# Patient Record
Sex: MALE | Race: OTHER | Employment: UNEMPLOYED | URBAN - METROPOLITAN AREA
[De-identification: names, ages, dates, MRNs, and addresses within clinical notes are randomized per-mention and may not be internally consistent; named-entity substitution may affect disease eponyms.]

---

## 2024-02-19 ENCOUNTER — HOSPITAL ENCOUNTER (INPATIENT)
Facility: HOSPITAL | Age: 59
LOS: 9 days | Discharge: HOME | DRG: 885 | End: 2024-02-28
Attending: PSYCHIATRY & NEUROLOGY | Admitting: PSYCHIATRY & NEUROLOGY
Payer: MEDICARE

## 2024-02-19 DIAGNOSIS — F20.0 PARANOID SCHIZOPHRENIA (HCC): Primary | ICD-10-CM

## 2024-02-19 PROBLEM — F20.9 SCHIZOPHRENIA (HCC): Status: ACTIVE | Noted: 2024-02-19

## 2024-02-19 PROCEDURE — 1140000000 HC RM PRIVATE PSYCH

## 2024-02-19 PROCEDURE — 6370000000 HC RX 637 (ALT 250 FOR IP): Performed by: PSYCHIATRY & NEUROLOGY

## 2024-02-19 RX ORDER — LORAZEPAM 1 MG/1
2 TABLET ORAL EVERY 6 HOURS PRN
Status: DISCONTINUED | OUTPATIENT
Start: 2024-02-19 | End: 2024-02-28 | Stop reason: HOSPADM

## 2024-02-19 RX ORDER — ACETAMINOPHEN 325 MG/1
650 TABLET ORAL EVERY 4 HOURS PRN
Status: DISCONTINUED | OUTPATIENT
Start: 2024-02-19 | End: 2024-02-28 | Stop reason: HOSPADM

## 2024-02-19 RX ORDER — AMLODIPINE BESYLATE 5 MG/1
5 TABLET ORAL DAILY
Status: DISCONTINUED | OUTPATIENT
Start: 2024-02-19 | End: 2024-02-25

## 2024-02-19 RX ORDER — OLANZAPINE 5 MG/1
10 TABLET, ORALLY DISINTEGRATING ORAL 2 TIMES DAILY
Status: DISCONTINUED | OUTPATIENT
Start: 2024-02-19 | End: 2024-02-22

## 2024-02-19 RX ORDER — DIVALPROEX SODIUM 500 MG/1
500 TABLET, DELAYED RELEASE ORAL 3 TIMES DAILY
Status: DISCONTINUED | OUTPATIENT
Start: 2024-02-19 | End: 2024-02-28 | Stop reason: HOSPADM

## 2024-02-19 RX ORDER — MAGNESIUM HYDROXIDE/ALUMINUM HYDROXICE/SIMETHICONE 120; 1200; 1200 MG/30ML; MG/30ML; MG/30ML
30 SUSPENSION ORAL EVERY 6 HOURS PRN
Status: DISCONTINUED | OUTPATIENT
Start: 2024-02-19 | End: 2024-02-28 | Stop reason: HOSPADM

## 2024-02-19 RX ORDER — LORAZEPAM 2 MG/ML
2 INJECTION INTRAMUSCULAR EVERY 6 HOURS PRN
Status: DISCONTINUED | OUTPATIENT
Start: 2024-02-19 | End: 2024-02-28 | Stop reason: HOSPADM

## 2024-02-19 RX ORDER — AMLODIPINE BESYLATE 5 MG/1
5 TABLET ORAL DAILY
Status: DISCONTINUED | OUTPATIENT
Start: 2024-02-19 | End: 2024-02-19

## 2024-02-19 RX ORDER — ZIPRASIDONE HYDROCHLORIDE 20 MG/1
20 CAPSULE ORAL EVERY 12 HOURS PRN
Status: DISCONTINUED | OUTPATIENT
Start: 2024-02-19 | End: 2024-02-28 | Stop reason: HOSPADM

## 2024-02-19 RX ORDER — ZIPRASIDONE MESYLATE 20 MG/ML
20 INJECTION, POWDER, LYOPHILIZED, FOR SOLUTION INTRAMUSCULAR EVERY 12 HOURS PRN
Status: DISCONTINUED | OUTPATIENT
Start: 2024-02-19 | End: 2024-02-20

## 2024-02-19 RX ORDER — HYDROXYZINE 50 MG/1
50 TABLET, FILM COATED ORAL 3 TIMES DAILY PRN
Status: DISCONTINUED | OUTPATIENT
Start: 2024-02-19 | End: 2024-02-28 | Stop reason: HOSPADM

## 2024-02-19 RX ADMIN — LORAZEPAM 2 MG: 1 TABLET ORAL at 12:19

## 2024-02-19 RX ADMIN — AMLODIPINE BESYLATE 5 MG: 5 TABLET ORAL at 12:20

## 2024-02-19 RX ADMIN — DIVALPROEX SODIUM 500 MG: 500 TABLET, DELAYED RELEASE ORAL at 22:58

## 2024-02-19 RX ADMIN — OLANZAPINE 10 MG: 5 TABLET, ORALLY DISINTEGRATING ORAL at 22:58

## 2024-02-19 ASSESSMENT — SLEEP AND FATIGUE QUESTIONNAIRES
DO YOU HAVE DIFFICULTY SLEEPING: YES
AVERAGE NUMBER OF SLEEP HOURS: 6
DO YOU USE A SLEEP AID: YES

## 2024-02-19 ASSESSMENT — LIFESTYLE VARIABLES
HOW OFTEN DO YOU HAVE A DRINK CONTAINING ALCOHOL: NEVER
HOW MANY STANDARD DRINKS CONTAINING ALCOHOL DO YOU HAVE ON A TYPICAL DAY: PATIENT DOES NOT DRINK

## 2024-02-19 NOTE — BH NOTE
Patient gave writer these numbers for family members:    Gayathri Harman (daughter)- [phone]    Diana Magallanes (sister)- [phone]    Mason Magallanes (sister)- [phone]

## 2024-02-19 NOTE — PLAN OF CARE
Problem: Behavior  Goal: Pt/Family maintain appropriate behavior and adhere to behavioral management agreement, if implemented  Description: INTERVENTIONS:  1. Assess patient/family's coping skills and  non-compliant behavior (including use of illegal substances)  2. Notify security of behavior or suspected illegal substances which indicate the need for search of the family and/or belongings  3. Encourage verbalization of thoughts and concerns in a socially appropriate manner  4. Utilize positive, consistent limit setting strategies supporting safety of patient, staff and others  5. Encourage participation in the decision making process about the behavioral management agreement  6. If a visitor's behavior poses a threat to safety call refer to organization policy.  7. Initiate consult with , Psychosocial CNS, Spiritual Care as appropriate  Outcome: Progressing     Problem: Involuntary Admit  Goal: Will cooperate with staff recommendations and doctor's orders and will demonstrate appropriate behavior  Description: INTERVENTIONS:  1. Treat underlying conditions and offer medication as ordered  2. Educate regarding involuntary admission procedures and rules  3. Contain excessive/inappropriate behavior per unit and hospital policies  Outcome: Progressing     Problem: Anxiety  Goal: Will report anxiety at manageable levels  Description: INTERVENTIONS:  1. Administer medication as ordered  2. Teach and rehearse alternative coping skills  3. Provide emotional support with 1:1 interaction with staff  Outcome: Not Progressing

## 2024-02-19 NOTE — GROUP NOTE
Group Therapy Note    Date: 2/19/2024    Group Start Time: 1230  Group End Time: 1315  Group Topic: Process Group - Inpatient    SSR 2 BEHA [facility] ACUTE    Lorraine Villegas MSW        Group Therapy Note: Facilitator engaged the group in discussion about hospital goals and pt goals for treatment. The group shared stressors and compared maladaptive and adaptive coping strategies.     Attendees: 4       Patient's Goal:  Pt came to group but stated he doesn't speak English. He did say in English, \"I'm not crazy.\"       Signature:  TYRA PIEDRA

## 2024-02-19 NOTE — BH NOTE
1552- Writer left VM with Dr. Osuna regarding pt bp still elevated at 1545 following administration of norvasc 5mg given at 1220.     1557- Pt refused scheduled depakote and stated \"no no I'm not taking it. I'm sleeping.\" And continued to close his eyes and sleep.

## 2024-02-19 NOTE — BH NOTE
PSYCHOSOCIAL ASSESSMENT  :Patient identifying info:   Lewis Magallanes is a 58 y.o., male admitted 2/19/2024 10:53 AM     Writer gathered information from pts admission note from [facility] ED due to pts mental status    Presenting problem and precipitating factors: Pt was brought into the [facility] in [facility] by police after the family called due to his bizarre, manic behaviors. It was reported by the NE that the pt was in the street yelling at no one. Pt shared that nothing was wrong with him and that he has never been hospitalized before. Family reported that the pt has been and that he was \"acting out\". When asked if he was HI/SI he stated \"there has been only 2 people, 2 people dead this morning\"    Mental status assessment: Pt presented with a manic affect during assessment with the nurse during admission. Pt had tangential, delusional thoughts and responses. Pt had no insight and poor judgement.     Strengths/Recreation/Coping Skills:could not assess due to pts mental status     Collateral information: Pt provided numbers to the nurse Gayathri Harman (daughter)- [phone], Diana Magallanes (sister)- [phone], Mason Magallanes (sister)- [phone]    Current psychiatric /substance abuse providers and contact info: could not be assessed due to pts mental status    Previous psychiatric/substance abuse providers and response to treatment: Pt was admitted in a  in the past per family     Family history of mental illness or substance abuse: could not be assessed due to pts mental status     Substance abuse history:    Social History     Tobacco Use    Smoking status: Never    Smokeless tobacco: Never   Substance Use Topics    Alcohol use: Not Currently       History of biomedical complications associated with substance abuse: Pt denied substance abuse per ED note    Patient's current acceptance of treatment or motivation for change: Pt shared that his family thinks he's crazy to the nurse

## 2024-02-19 NOTE — BH NOTE
PSA PART II ADDITIONAL INFORMATION        Access To [employer]:  could not be assessed due to pts mental status     Substance Use: Yes    Last Use: unknown     Type of Substance: alcohol    Frequency of Use: occasional     Request to See : No    If yes, notified : No    Guardian:No    Guardian Contact:Pt is his own legal guardian    Release of Information Signed: No    Release of Information Signed For: pt provided numbers to family to nursing staff    Goal: writer developed the goal of medication management for pt, pt un able to participate in goal due to mental status

## 2024-02-19 NOTE — BH NOTE
Pt received now dose of norvasc 5mg for htn and received ativan 2mg po willingly @ 1220 due to pt running and doing jumping jacks in the hallway and in the dayroom. Pt intrusive to others and speaks loudly. Pt able to be redirected by staff and can be labile at times.     1305- Pt currently laying down in his room resting quietly. Q 15 min checks continued to ensure pt safety.

## 2024-02-19 NOTE — BH NOTE
57 y/o  male, admitted to Rm 241, to the service of Dr. Aguilar, w/DX: SCHIZOPHRENIA & HTN, under a TDO. Arrived on unit, via w/c, from [facility], in [place], [place]. Pt is here under a TDO.     Pt exhibited manic behavior, upon arrival, on the unit (very restless, talking loud w/pressured speech, hypertalkative). Followed directions. Good eye to eye contact.  Pt speaks very little English. An  (Joyce) was contacted; however, pt's responses, to the questions were usually unrelated, to the questions asked.      Pt was wearing a black sweater and blue paper top and pants. Cooperative with personal search by writer, and LIS Saucedo. No contrabands. No bruises/breaks in skin.         Q 15 mins checks initiated, for safety.

## 2024-02-19 NOTE — CARE COORDINATION
02/19/24 1346   ITP   Date of Plan 02/19/24   Date of Next Review 02/26/24   Primary Diagnosis Code Principal Schizophrenia (HCC) F20.9   Barriers to Treatment Client resistance;Other (comment);Need for psychoeducation;Psychiatric symptom (comment)  (language barrier, lack of insight, not taking medication)   Strengths Incorporated in Plan Family supports   Plan of Care   Long Term Goal (LTG) Stated in patient/guardian terms Pt will be medication compliant and understand the importance of taking his medications   Short Term Goal 1   Baseline Functioning Pt is not taking medications   Target Pt will take medications PO with minimal assistance   Objectives Client will participate in group therapy;Client will participate in individual therapy   Intervention 1 Acknowledge client strengths;Monitor medications   Frequency daily   Measured by Behavioral data;Self report;Staff observation   Staff Responsible [facility] staff;Clinical staff   Intervention 2 Indvidual therapy   Frequency daily   Measured by Behavioral data;Self report;Staff observation   Staff Responsible [facility] staff;Clinical staff   Intervention 3 Referral to community services   Frequency prior to discharge   Measured by Behavioral data;Self report;Staff observation   Staff Responsible [facility] staff;Clinical staff   STG Goal 1 Status: Patient Appears to be  Progressing toward treatment plan goal   Short Term Goal 2   Short Term Goal 2 Client will use words to express feelings, wants, and needs   Baseline Functioning Pt currently has tangential thoughts and speech   Target Pt will have organized, logical thoughts and speech   Objectives Client will participate in individual therapy;Client will participate in group therapy   Intervention 1 Acknowledge client strengths;Indvidual therapy   Frequency daily   Measured by Behavioral data;Self report;Staff observation   Staff Responsible [facility] staff;Clinical staff   Intervention 2 Monitor medications   Frequency daily

## 2024-02-20 PROCEDURE — 6370000000 HC RX 637 (ALT 250 FOR IP): Performed by: PSYCHIATRY & NEUROLOGY

## 2024-02-20 PROCEDURE — 1140000000 HC RM PRIVATE PSYCH

## 2024-02-20 RX ORDER — ZIPRASIDONE MESYLATE 20 MG/ML
20 INJECTION, POWDER, LYOPHILIZED, FOR SOLUTION INTRAMUSCULAR 2 TIMES DAILY PRN
Status: DISCONTINUED | OUTPATIENT
Start: 2024-02-21 | End: 2024-02-28 | Stop reason: HOSPADM

## 2024-02-20 RX ADMIN — DIVALPROEX SODIUM 500 MG: 500 TABLET, DELAYED RELEASE ORAL at 21:55

## 2024-02-20 RX ADMIN — AMLODIPINE BESYLATE 5 MG: 5 TABLET ORAL at 08:20

## 2024-02-20 RX ADMIN — OLANZAPINE 10 MG: 5 TABLET, ORALLY DISINTEGRATING ORAL at 08:20

## 2024-02-20 RX ADMIN — OLANZAPINE 10 MG: 5 TABLET, ORALLY DISINTEGRATING ORAL at 21:55

## 2024-02-20 RX ADMIN — DIVALPROEX SODIUM 500 MG: 500 TABLET, DELAYED RELEASE ORAL at 15:06

## 2024-02-20 RX ADMIN — HYDROXYZINE HYDROCHLORIDE 50 MG: 50 TABLET, FILM COATED ORAL at 07:13

## 2024-02-20 RX ADMIN — DIVALPROEX SODIUM 500 MG: 500 TABLET, DELAYED RELEASE ORAL at 08:23

## 2024-02-20 RX ADMIN — LORAZEPAM 2 MG: 1 TABLET ORAL at 08:20

## 2024-02-20 RX ADMIN — Medication 1 LOZENGE: at 22:56

## 2024-02-20 NOTE — BH NOTE
59yo admitted 2/19/24.    Hx schizophrenia, HTN.     VS /100, 120/92. Noted sleeping at start of shift. Then up around bedtime, took shower. Compliant with bedtime snack & medications. Presents with sexually inappropriate comments/gestures, labile, tangential. Up for a little while initially unable to go back to sleep d/t sleeping a lot during evening. At nursing station frequently with multiple requests, redirected as needed regarding behavior, however manageable. Eventually falling back asleep.     Safety checks q15m.

## 2024-02-20 NOTE — GROUP NOTE
Group Therapy Note    Date: 2/20/2024    Group Start Time: 1300  Group End Time: 1330  Group Topic: Process Group - Inpatient    SSR 2 BEHA [facility]    Mike Singh        Group Therapy Note: This writer provided a handout on \"building happiness\" to each individual to work on independently and reach out to any of the 's if further assistance was needed in order to process the information.     Attendees: 9         Patient's Goal:  to attend groups    Notes:      Group Therapy Note: This writer provided a handout on \"building happiness\" to each individual to work on independently and reach out to any of the 's if further assistance was needed in order to process the information.     Signature:  Mike Singh

## 2024-02-20 NOTE — BH NOTE
Behavioral Health Treatment Team Note     Patient goal(s) for today: pt did not voice any  Treatment team focus/goals: meds management, dc planning, group therapy    Progress note: Pt was seen in the dayroom as he was consuming his lunch. Pt presented as alert, not oriented, bizarre, making random statements out loud and not able to meaningfully engage in conversation. Pt able to follow simple english language but chose to close his eyes while consuming lunch and yell randomly. Pt stated he was doing \"good\" and did not voice any concerns. Unable to accurately assess if pt is experiencing si/hi/ah/vh at this time given his altered mental status. Pt was seen earlier during the shift wiping down the hallway walls with a wash cloth provided to him by the staff on the bhu. Pt cont to meet criteria for inpt stay for further stabilization through meds management.      LOS:  1  Expected LOS: Expires: 2/29/2024      Insurance info/prescription coverage:   MEDICARE PART A AND B   Date of last family contact:  follow up pending at this time  Family requesting physician contact today:  No  Discharge plan:  to stabilize pt  Guns in the home:  No   Outpatient provider(s):  will be established prior to dc    Participating treatment team members: Leiws Magallanes, * (assigned SW), Mike Singh, MS

## 2024-02-20 NOTE — GROUP NOTE
Group Therapy Note    Date: 2/19/2024    Group Start Time: 1930  Group End Time: 2015  Group Topic: Recreational    SSR 2 BH NON ACUTE    Carmela Jonas        Group Therapy Note    Attendees: 3/8    Recreational Therapist facilitated structured leisure skills group to introduce healthy leisure skills as positive way to cope and manage mood.             Notes:Attended group and listened to songs with peers.  Pt was receptive to intervention and responded to prompts from staff.    Discipline Responsible: Recreational Therapist      Signature:  DAVID Hooker

## 2024-02-20 NOTE — BH NOTE
HEARING DISPOSITION     : Judge Omer   : Ms. Bolton   Committed: 10 Days   THELMA: Anti-Psychotic and Anti-Anxiety   Expires: 2/29/2024

## 2024-02-20 NOTE — H&P
[facility]  PSYCH HISTORY AND PHYSICAL    Name:  TEOFILO PLASENCIA  MR#:  [id number]  :  1965  ACCOUNT #:  [id number]  ADMIT DATE:  2024    HISTORY OF PRESENT ILLNESS:  This is a 58-year-old male patient admitted to the Behavioral Health Unit from another hospital from [facility].  The patient is highly manic, intrusive, irritable, angry, poking his finger at me and saying that I was his doctor in [facility].  He was not too happy and he did not want to see me.  He was also doing the jumping garth, running on the floor always in the day room, intrusive towards others, speaks loudly.  He was hard to redirect.  Poor historian.  There are several numbers, his daughter's number is Gayathri Harman, [phone], and a couple of sisters' numbers.  We called the daughter, says he had been not getting any help lately, that he had a prior hospitalization 3 years ago in [facility], was hospitalized and gone for a clinic, and then later he was admitted to some inpatient clinic for 2 months in [place].  She does not know any medications.  Apparently, family did not know that where he was at, they were wondering what happened.  The patient apparently , , used to be .  To daughter's knowledge, he did not have any prior psychiatric treatment other than what happened 3 years ago.    TRAUMA HISTORY:  Unknown.    SUBSTANCE ABUSE HISTORY:  She does not know.    ALLERGIES TO MEDICATIONS:  NO KNOWN ALLERGIES.    LABORATORY DATA:  There are no labs in the computer.    PAST MEDICAL HISTORY:  Unknown, however, his blood pressure is high.    Apparently, he was brought from [place], [place], with police as the family called due to his bizarre manic behavior.  Reportedly, the patient was on the street yelling at no one.  The patient states that nothing was wrong with him and that he has never been hospitalized before.  However, family

## 2024-02-21 PROCEDURE — 1140000000 HC RM PRIVATE PSYCH

## 2024-02-21 PROCEDURE — 6370000000 HC RX 637 (ALT 250 FOR IP): Performed by: PSYCHIATRY & NEUROLOGY

## 2024-02-21 RX ADMIN — LORAZEPAM 2 MG: 1 TABLET ORAL at 08:13

## 2024-02-21 RX ADMIN — DIVALPROEX SODIUM 500 MG: 500 TABLET, DELAYED RELEASE ORAL at 21:24

## 2024-02-21 RX ADMIN — OLANZAPINE 10 MG: 5 TABLET, ORALLY DISINTEGRATING ORAL at 21:24

## 2024-02-21 RX ADMIN — Medication 1 LOZENGE: at 18:01

## 2024-02-21 RX ADMIN — OLANZAPINE 10 MG: 5 TABLET, ORALLY DISINTEGRATING ORAL at 08:13

## 2024-02-21 RX ADMIN — DIVALPROEX SODIUM 500 MG: 500 TABLET, DELAYED RELEASE ORAL at 08:13

## 2024-02-21 RX ADMIN — DIVALPROEX SODIUM 500 MG: 500 TABLET, DELAYED RELEASE ORAL at 17:21

## 2024-02-21 RX ADMIN — AMLODIPINE BESYLATE 5 MG: 5 TABLET ORAL at 08:13

## 2024-02-21 RX ADMIN — LORAZEPAM 2 MG: 1 TABLET ORAL at 21:24

## 2024-02-21 ASSESSMENT — PAIN SCALES - GENERAL: PAINLEVEL_OUTOF10: 0

## 2024-02-21 ASSESSMENT — PAIN DESCRIPTION - DESCRIPTORS: DESCRIPTORS: ITCHING

## 2024-02-21 ASSESSMENT — PAIN DESCRIPTION - LOCATION: LOCATION: THROAT

## 2024-02-21 NOTE — BH NOTE
Patient is in a manic state, and has been awake all night, in the hallway wiping down the walls, door jams, windows etc. He has put a towel on top of his toilet and stood on the toilet pulling dust bunnies out of the ceiling tiles, and vents over toilet in his bathroom. Pt stays at nurse's station a lot staring at staff and asks question, after question, and switches between English and Guamanian, as staff reminds him that they do no speak Guamanian he will switch to English. But mostly talking to self in the lacey constantly responding to internal stimuli mumbling in Guamanian. Pt first refused his Depakote and Zyprexa then came back up 20 mins later and took the medication after this Writer called Dr. Aguilar and received the order to treat for Geodon IM if he refuses his Zyprexa. Pt was coughing after eating cookies, nurse assessed patient who informed her his throat was scratchy and sore, so Dr. Aguilar notified again to receive an order for Cepacol throat lozenges which was given to patient with no other complaints voiced. Pt refuses to go to sleep. When patient was speaking with staff and another patient walks near him, he waves his hand at peers and \"shoos\" them away getting very agitated.    Pt had a large amount of pictures in his room of a female peer and her family and small daughter. He and this peer who do not know each other or nor are they related are telling staff that he is her dad and she even asked the patient to do a DNA test. This female who apparently gave the pictures to him and is delusional thinking they are related. The female patient's real father was found dead by the female at the age of 14 which she now says he isn't dead, that this writer had something to do with it and that Lewis is her real dad. She has convinced Lewis of this and even had him agreeing to a DNA test.     Pt has poor judgment, poor insight, denies depression, anxiety, SI, HI and hallucinations. Pt continuing to refuse to

## 2024-02-21 NOTE — BH NOTE
Day shift    Pt up ad vivian on unit. Pt manic,hyper-verbal, and labile. Pt denies depression and anxiety. Pt denies SI/HI. Pt denies AVH. Pt takes medication without difficulty. Pt is sleep majority of this shift but when awake pt is manic and hyper mobile on the unit and rambling to peers and staff. Pt continues to show poor boundaries, poor insight, and labile behavior and easily irritated when he does not get his way. Close observations continued to ensure pt safety.

## 2024-02-21 NOTE — GROUP NOTE
Group Therapy Note    Date: 2/21/2024    Group Start Time: 1315  Group End Time: 1400  Group Topic: Process Group - Inpatient    SSR 2 BEHA [facility] ACUTE    Odette Huber        Group Therapy Note  Process group was focused on self-identity and discovery. Writer provided the pts with self-discovery questions with topics of self-exploration, life purpose, career, passions, self-awareness and relationships. The ice breaker question was \"where do you see yourself in 5 years\". Pts were quite but shared there answers when prompted.   Attendees: 3-9         Notes:  Pt was sleeping and needed his rest       Signature:  Odette Huber

## 2024-02-21 NOTE — BH NOTE
Behavioral Health Treatment Team Note     Patient goal(s) for today: none stated  Treatment team focus/goals: continue medication management, group therapy, maintain ADLs and provide a safe discharge    Progress note: Writer tried to called pts sisters and did not get a response. Writer did not wake the pt up because it was important for the pts treatment to get rest. Writer was informed that the pt did not sleep last night. Pt fell asleep after breakfast and continued to sleep during the day. Writer will continue to call pts family and follow up with pt tomorrow.     LOS:  2  Expected LOS: TDO 2-29-24 with THELMA    Insurance info/prescription coverage:  Medicare  Date of last family contact:  2-21-24  Family requesting physician contact today:  No  Discharge plan:  to stabilize   Guns in the home:  No   Outpatient provider(s):  will be coordinated prior to discharge    Participating treatment team members: Lewis Magallanes, * (assigned SW), Odette Huber LMSW

## 2024-02-21 NOTE — GROUP NOTE
Group Therapy Note    Date: 2/20/2024    Group Start Time: 1945  Group End Time: 2030  Group Topic: Recreational    SSR 2 BH NON ACUTE    Carmela Jonas        Group Therapy Note    Attendees: 7/9    Recreational Therapist facilitated structured leisure skills group to introduce healthy leisure skills as positive way to cope and manage mood.         Patient's Goal:  Client will use words to express feelings, wants, and needs     Notes:  Attended group. Was able to sit in session for short periods. Difficulty communicating with peers due to language barrier. Wrote down song title to listen to in session. Provided art materials for leisure task but declined materials. Listened to songs played during group.     Status After Intervention:  Unchanged    Participation Level: Minimal    Participation Quality: Attentive      Speech:  loud tone      Thought Process/Content: unable to assess     Affective Functioning: Blunted      Mood: anxious      Level of consciousness:  Alert      Response to Learning: Progressing to goal      Endings: None Reported    Modes of Intervention: Activity      Discipline Responsible: Recreational Therapist      Signature:  DAVID Hooker

## 2024-02-22 PROCEDURE — 6370000000 HC RX 637 (ALT 250 FOR IP): Performed by: PSYCHIATRY & NEUROLOGY

## 2024-02-22 PROCEDURE — 1140000000 HC RM PRIVATE PSYCH

## 2024-02-22 RX ORDER — CHLORPROMAZINE HYDROCHLORIDE 50 MG/1
50 TABLET, FILM COATED ORAL 3 TIMES DAILY
Status: DISCONTINUED | OUTPATIENT
Start: 2024-02-22 | End: 2024-02-28 | Stop reason: HOSPADM

## 2024-02-22 RX ADMIN — OLANZAPINE 10 MG: 5 TABLET, ORALLY DISINTEGRATING ORAL at 09:25

## 2024-02-22 RX ADMIN — LORAZEPAM 2 MG: 1 TABLET ORAL at 15:09

## 2024-02-22 RX ADMIN — DIVALPROEX SODIUM 500 MG: 500 TABLET, DELAYED RELEASE ORAL at 15:09

## 2024-02-22 RX ADMIN — OLANZAPINE 10 MG: 5 TABLET, ORALLY DISINTEGRATING ORAL at 19:52

## 2024-02-22 RX ADMIN — DIVALPROEX SODIUM 500 MG: 500 TABLET, DELAYED RELEASE ORAL at 19:52

## 2024-02-22 RX ADMIN — LORAZEPAM 2 MG: 1 TABLET ORAL at 09:25

## 2024-02-22 RX ADMIN — AMLODIPINE BESYLATE 5 MG: 5 TABLET ORAL at 09:25

## 2024-02-22 RX ADMIN — DIVALPROEX SODIUM 500 MG: 500 TABLET, DELAYED RELEASE ORAL at 09:25

## 2024-02-22 RX ADMIN — ZIPRASIDONE HYDROCHLORIDE 20 MG: 20 CAPSULE ORAL at 17:58

## 2024-02-22 RX ADMIN — LORAZEPAM 2 MG: 1 TABLET ORAL at 19:52

## 2024-02-22 NOTE — BH NOTE
Patient was hypomanic this evening, hyperverbal, cleaning the unit with toilet paper. Patient was med compliant after becoming irritable with this writer when given meds. He finally took it with some time and persuasion, then became more manic and all over the unit. He was given Ativan PO and he finally started to become sleepy but refused to go to bed when asked. Nurse led him into his room, showed him to lay down and put head on pillow, removed patient's shoes and place his feet in bed and covered patient up at about midnight. Pt has been snoring and sleeping well ever sense with no signs of distress noted, respirations regular and unlabored. Will continue to monitor patient every 15 mins as per unit protocol.

## 2024-02-22 NOTE — GROUP NOTE
Group Therapy Note    Date: 2/21/2024    Group Start Time: 1945  Group End Time: 2030  Group Topic: Recreational    SSR 2 BH NON ACUTE    Carmela Jonas        Group Therapy Note    Attendees: 6/7     Recreational Therapist facilitated structured leisure skills group to introduce healthy leisure skills as positive way to cope and manage mood       Patient's Goal:  Client will use words to express feelings, wants, and needs     Notes:  In and out of group room. Unable to focus. Preoccupied . Pt left group and did not return.     Status After Intervention:  Unchanged    Participation Level: Minimal    Participation Quality: Intrusive      Speech:  pressured      Thought Process/Content: unable to assess      Affective Functioning: labile      Mood: anxious      Level of consciousness:  Preoccupied      Response to Learning: Progressing to goal      Endings: None Reported    Modes of Intervention: Activity      Discipline Responsible: Recreational Therapist      Signature:  DAVID Hooker

## 2024-02-22 NOTE — GROUP NOTE
Group Therapy Note    Date: 2/22/2024    Group Start Time: 1720  Group End Time: 1810  Group Topic: Recreational    SSR 2  NON ACUTE    Perri Dumas        Group Therapy Note    Facilitated leisure skills group to reinforce positive coping and to manage mood through music, social interaction, group activities and art task      Attendees: 5/6       Patient's Goal: Client will use words to express feelings, wants and needs     Notes:  Pt was in and  out of group.  Receptive to listening to music for a few minutes while displaying dancing. Needed redirecting for getting too close to peers    Status After Intervention:  Unchanged    Participation Level: Minimal    Participation Quality: Attentive      Speech:  Rambling      Thought Process/Content: Disorganized      Affective Functioning: Blunted      Mood: anxious      Level of consciousness:  Preoccupied      Response to Learning: Progressing to goal      Endings: None Reported    Modes of Intervention: Socialization and Activity      Discipline Responsible: Recreational Therapist      Signature:  DAVID Tran

## 2024-02-22 NOTE — GROUP NOTE
Group Therapy Note    Date: 2/22/2024    Group Start Time: 1315  Group End Time: 1400  Group Topic: Process Group - Inpatient    SSR 2 BEHA [facility] ACUTE    Lorraine Villegas MSW        Group Therapy Note: Facilitator encouraged the group to identify needs and discussed SMART goals as a method to ensure their needs are met. Conducted CBT to manage thoughts, emotions and behaviors.     Attendees: 3       Patient's Goal:  Pt did not attend.       Signature:  TYRA PIEDRA

## 2024-02-22 NOTE — GROUP NOTE
Group Therapy Note    Date: 2/22/2024    Group Start Time: 1115  Group End Time: 1155  Group Topic: Education Group - Inpatient    SSR 2  NON ACUTE    Perri Dumas        Group Therapy Note    Facilitated discussion focused on defining and sharing examples of different types of automatic negative thoughts and how they affect moods and behaviors       Attendees: 4/7       Notes:  Encouraged but did not attend    Discipline Responsible: Recreational Therapist      Signature:  DAVID Tran
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
## 2024-02-23 PROCEDURE — 6370000000 HC RX 637 (ALT 250 FOR IP): Performed by: PSYCHIATRY & NEUROLOGY

## 2024-02-23 PROCEDURE — 1140000000 HC RM PRIVATE PSYCH

## 2024-02-23 RX ORDER — CHLORPROMAZINE HYDROCHLORIDE 25 MG/1
25 TABLET, FILM COATED ORAL 3 TIMES DAILY
Status: CANCELLED | OUTPATIENT
Start: 2024-02-24

## 2024-02-23 RX ADMIN — DIVALPROEX SODIUM 500 MG: 500 TABLET, DELAYED RELEASE ORAL at 11:49

## 2024-02-23 RX ADMIN — LORAZEPAM 2 MG: 1 TABLET ORAL at 06:57

## 2024-02-23 RX ADMIN — HYDROXYZINE HYDROCHLORIDE 50 MG: 50 TABLET, FILM COATED ORAL at 15:42

## 2024-02-23 RX ADMIN — DIVALPROEX SODIUM 500 MG: 500 TABLET, DELAYED RELEASE ORAL at 20:40

## 2024-02-23 RX ADMIN — CHLORPROMAZINE HYDROCHLORIDE 50 MG: 50 TABLET, FILM COATED ORAL at 11:49

## 2024-02-23 RX ADMIN — AMLODIPINE BESYLATE 5 MG: 5 TABLET ORAL at 11:48

## 2024-02-23 RX ADMIN — CHLORPROMAZINE HYDROCHLORIDE 50 MG: 50 TABLET, FILM COATED ORAL at 20:40

## 2024-02-23 RX ADMIN — CHLORPROMAZINE HYDROCHLORIDE 50 MG: 50 TABLET, FILM COATED ORAL at 00:38

## 2024-02-23 RX ADMIN — Medication 1 LOZENGE: at 21:02

## 2024-02-23 ASSESSMENT — PAIN SCALES - GENERAL: PAINLEVEL_OUTOF10: 2

## 2024-02-23 ASSESSMENT — PAIN DESCRIPTION - LOCATION: LOCATION: THROAT

## 2024-02-23 NOTE — BH NOTE
Patient came into day room with only 1 gown on and opened to the front. He reached down to put his toothbrush into a pocket but did not have his shorts on and exposed himself to a female patient in the day room. Staff realized this and removed Lewis and made sure he was covered with 2 gowns and educated him on keeping himself covered. Pt walking around unit now, still somewhat groggy and delusional, calling the female patient his daughter. Will continue to monitor patient every 15 mins as per unit protocol.

## 2024-02-23 NOTE — BH NOTE
Pt.is up and visible on unit, requires redirection occasionally tries to push on door,open.observed reaching out into air.accepting medication as ordered.speaks in Estonian but has to be reminded to speak in english. No other concerns voiced.remains on close observation.

## 2024-02-23 NOTE — GROUP NOTE
Group Therapy Note    Date: 2/23/2024    Group Start Time: 1300  Group End Time: 1330  Group Topic: Process Group - Inpatient    SSR 2 [facility]    Mike Singh        Group Therapy Note: This writer facilitated a group where the emotion of \"anxiety\" was discussed along with it's triggers and positive ways to cope.     Attendees: 1       Patient's Goal:  to attend groups    Notes:  Pt was resting at that time and did not attend.      Signature:  Mike Singh

## 2024-02-23 NOTE — BH NOTE
Pt.is up and visible on unit,affect is flat, requires redirection.appears confused at times,disorganized,  maniky behaviors. Speech hard to understand, no aggressive behaviors, preoccupied, pts.room is malodorous urine in floor,no concerns voiced, remains on close obseravtion.

## 2024-02-23 NOTE — GROUP NOTE
Group Therapy Note    Date: 2/23/2024    Group Start Time: 1730  Group End Time: 1815  Group Topic: Recreational    SSR 2  NON ACUTE    Perri Dumas        Group Therapy Note    Facilitated leisure skills group to reinforce positive coping and to manage mood through music, social interaction, group activities and art task      Attendees: 2/6       Patient's Goal:  Client will use words to express feelings, wants and needs    Notes:  Pt was receptive to listening to music and dancing for a few minutes. Was able to sit quiet and write for about 10 minutes. Left group. Did not return     Status After Intervention:  Unchanged    Participation Level: Active Listener    Participation Quality: Appropriate      Speech:  Rambling at times      Thought Process/Content: Disorganized      Affective Functioning: Blunted      Mood:  Calm      Level of consciousness:  Preoccupied      Response to Learning: Progressing to goal      Endings: None Reported    Modes of Intervention: Socialization and Activity      Discipline Responsible: Recreational Therapist      Signature:  Perri Dumas, KENNETHS

## 2024-02-23 NOTE — GROUP NOTE
Group Therapy Note    Date: 2/23/2024    Group Start Time: 1120  Group End Time: 1155  Group Topic: Education Group - Inpatient    SSR 2  NON ACUTE    Perri Dumas        Group Therapy Note    Facilitated discussion on stress exploration focused on being able to identify daily hassles, major life changes and life circumstances that contribute to stress and identify daily uplifts, healthy coping strategies and protective factors that counteract stress       Attendees: 4/6       Patient's Goal:  Client will use words to express feelings, wants and needs    Notes:  Pt was preoccupied and unable to focus. Left group. Did not return    Status After Intervention:  Unchanged    Participation Level: Minimal    Participation Quality: Unable to focus      Speech:  Rambling      Thought Process/Content: Disorganized      Affective Functioning: Blunted      Mood: anxious      Level of consciousness:  Preoccupied      Response to Learning: Progressing to goal      Endings: None Reported    Modes of Intervention: Education and Support      Discipline Responsible: Recreational Therapist      Signature:  KENNETH TranS

## 2024-02-23 NOTE — BH NOTE
Patient all over unit with signs of ant, hyperverbal, talking constantly in Guatemalan to self and others, reminded to staff only speaks English and he will switch what he is asking to English and makes most of his requests know however he mumbles in both languages so he is always difficult to understand. Even  had a difficult time understanding patient and saying that most of what he says is flight of ideas, loose associations and not answering the questions asked, not making sense. Pt had wet linen on bed, a long sleeve shirt soaking wet hanging on his door knob in the hallway, and food trays in the floor, papers and trash all over floor. Nurse assisted patient in removing dirty linen and instructed him to help clean up, put clean linen on bed, staff cleaned floor up, and gave pt ice water and snack which he consumed after taking his scheduled night time medications around 2000pm, he was compliant and did receive Ativan to help him calm down and be able to rest. Nurse noticed patient being groggy in lacey and day room but still up moving around refusing to just go to bed and rest. He went into several other peers rooms, mainly females when most were not in there and was redirected by staff. He did walk into one female's room and sit on her bed while she was toileting in her bathroom. Nurse assisted patient into the bed again and encouraged him to lay down and rest about 2030pm. Pt appears to be sleeping well now, snoring, with no signs of distress noted, respirations regular and unlabored. Will continue to monitor patient closely every 15 mins s per unit protocol.

## 2024-02-23 NOTE — BH NOTE
Behavioral Health Treatment Team Note     Patient goal(s) for today: due to pts mental status, writer's goal for pt is to be able to get healthy sleep    Treatment team focus/goals: continue medication management, maintaining pts safety by keeping him in his own room, maintain ADLs and provide a safe discharge    Progress note: Pt was observed walking the hallway with an unsteady gate. Pt said in Slovak \"Yo no problema\". Pts speech was slurred and difficult to understand. Pt appeared to be confused, irritable and frustrated. Then pt was trying to walk into another room. Pt said \"no\" when writer asked if he spoke with his daughter. Writer, nurse and another  helped pt back to his room and helped him into bed. Pts room was mal odorous and the floors were sticky. Writer provided the number to Dr. Palomino to Dr. Aguilar. An inpatient level of care is needed to further stabilize the pt     LOS:  4  Expected LOS: TDO 2-29 with [facility]    Insurance info/prescription coverage:  Medicare  Date of last family contact:  2-22-24  Family requesting physician contact today:  No  Discharge plan:  to stabilize the pt  Guns in the home:  No   Outpatient provider(s):  will be coordinated prior to discharge    Participating treatment team members: Lewis Magallanes, * (assigned SW), Odette Huber LMSW

## 2024-02-24 PROCEDURE — 6370000000 HC RX 637 (ALT 250 FOR IP): Performed by: PSYCHIATRY & NEUROLOGY

## 2024-02-24 PROCEDURE — 6360000002 HC RX W HCPCS: Performed by: PSYCHIATRY & NEUROLOGY

## 2024-02-24 PROCEDURE — 1140000000 HC RM PRIVATE PSYCH

## 2024-02-24 RX ADMIN — LORAZEPAM 2 MG: 2 INJECTION INTRAMUSCULAR; INTRAVENOUS at 02:42

## 2024-02-24 RX ADMIN — DIVALPROEX SODIUM 500 MG: 500 TABLET, DELAYED RELEASE ORAL at 13:40

## 2024-02-24 RX ADMIN — CHLORPROMAZINE HYDROCHLORIDE 50 MG: 50 TABLET, FILM COATED ORAL at 08:35

## 2024-02-24 RX ADMIN — AMLODIPINE BESYLATE 5 MG: 5 TABLET ORAL at 08:35

## 2024-02-24 RX ADMIN — Medication 1 LOZENGE: at 07:01

## 2024-02-24 RX ADMIN — CHLORPROMAZINE HYDROCHLORIDE 50 MG: 50 TABLET, FILM COATED ORAL at 13:40

## 2024-02-24 RX ADMIN — DIVALPROEX SODIUM 500 MG: 500 TABLET, DELAYED RELEASE ORAL at 08:35

## 2024-02-24 ASSESSMENT — PAIN DESCRIPTION - LOCATION: LOCATION: THROAT

## 2024-02-24 ASSESSMENT — PAIN SCALES - GENERAL: PAINLEVEL_OUTOF10: 2

## 2024-02-24 NOTE — BH NOTE
730am  Pt remains on 1:1, resting in bed quietly.     930am  Pt accepted and tolerated meds and meals in his room. Pt denied hearing voices or thoughts of self harm. Pt remain on 1:1    1130am  Pt out of room walking the lacey and in dayroom. Pt rambling at times during conversations with him. Pt denied feeling depressed or hearing voices. Pt remains on 1:1     130pm  Pt walking the halls a few times, spent time in the dayroom Pt on the phone to place call with help, numbers were not viable. Pt in his room at present pacing the floor. Pt remains on 1:1.

## 2024-02-24 NOTE — BH NOTE
330p  Pt out of room to walk the halls and into dayroom with peers. Pt with rambling speech, and hyperactivity. Pt remains on 1:1    530p  Pt attended music group in the dayroom. Pt without mgmt concerns. Pt remains on 1:1    630p  Pt in bed resting with eyes closed, resp even and  unlabored. Pt remains in 1:1

## 2024-02-24 NOTE — BH NOTE
Nurse Note:    1930- Patient observed with 1:1 staff walking around the unit and needed redirection to go to his assigned room and not another room. Patient is receptive to redirection, but continues to need redirection. No report of SI, HI, A/V hallucinations. Observed eating and drinking during snack time. 1:1 staff continues to monitor for safety.    2130- Patient observed with 1:1 staff and observed unstable on his feet at times, but did not fall. Patient is medication compliant and observed wiping down doors and windows on the unit. Staff continues to monitor for safety.    2325- Patient observed in the bathroom with shower curtain pulled down and the shower turned on; writer and 1:1 staff provided prompting for the patient to remove himself from the bathroom. Patient observed drooling and eyes are closed at times. Patient observed in the hallway writing. EVS put up the shower curtain and mopped the excess water up from the floor. Patient is currently resting quietly in bed; 1:1 staff continues to monitor for safety.    0130- Patient observed in the bathroom cleaning the baseboards; 1:1 staff requested for him to stop and to get some rest; patient refused and continued to clean. Staff was able to get patient to get some rest; patient is currently resting quietly with eyes closed; will continue to monitor for safety. 1:1 staff continues to monitor.    0242- Patient observed placing head under the sink and flipping his mattress and states, \"I'm looking for my phone\". Writer explained that he did not have a phone in this room; patient continued to look for phone in his room. Patient is restless and received Ativan IM 2mg.    0311- Patient became upset and called 1:1 staff a mutherfucker and grabbed his penis. Patient observed eating and drinking and is currently calm; 1:1 continues to monitor for safety.    0530- Patient is awake and is pleasant. 1:1 staff continues to monitor for safety.

## 2024-02-24 NOTE — GROUP NOTE
Group Therapy Note    Date: 2/24/2024    Group Start Time: 1120  Group End Time: 1200  Group Topic: Education Group - Inpatient    SSR 2  NON ACUTE    Perri Dumas        Group Therapy Note    Facilitated discussion focus on identifying different barriers that has prevented progress and identifying ways to confront them       Attendees: 3/6      Notes:  Did not attend. Pt went to room to lay down    Discipline Responsible: Recreational Therapist      Signature:  DAVID Tran

## 2024-02-24 NOTE — GROUP NOTE
Group Therapy Note    Date: 2/24/2024    Group Start Time: 1545  Group End Time: 1630  Group Topic: Recreational    SSR 2  NON ACUTE    Perri Dumas        Group Therapy Note    Facilitated leisure skills group to reinforce positive coping and to manage mood through music, social interaction, group activities and art task       Attendees: 5/7       Patient's Goal:  Client will use words to express feelings, wants and needs    Notes:  Pt was receptive to listening to music while displaying some dancing. Interacted with peer and staff. Was in and out of group      Status After Intervention:  Unchanged    Participation Level: Active Listener and Interactive    Participation Quality: Appropriate      Speech:  Rambling at times      Thought Process/Content: Perseverating      Affective Functioning: Blunted      Mood:  Calm      Level of consciousness:  Alert and Preoccupied      Response to Learning: Progressing to goal      Endings: None Reported    Modes of Intervention: Socialization and Activity      Discipline Responsible: Recreational Therapist      Signature:  DAVID Tran

## 2024-02-25 PROCEDURE — 1140000000 HC RM PRIVATE PSYCH

## 2024-02-25 PROCEDURE — 6370000000 HC RX 637 (ALT 250 FOR IP): Performed by: PSYCHIATRY & NEUROLOGY

## 2024-02-25 RX ORDER — AMLODIPINE BESYLATE 5 MG/1
10 TABLET ORAL DAILY
Status: DISCONTINUED | OUTPATIENT
Start: 2024-02-26 | End: 2024-02-28 | Stop reason: HOSPADM

## 2024-02-25 RX ADMIN — DIVALPROEX SODIUM 500 MG: 500 TABLET, DELAYED RELEASE ORAL at 13:24

## 2024-02-25 RX ADMIN — CHLORPROMAZINE HYDROCHLORIDE 50 MG: 50 TABLET, FILM COATED ORAL at 08:06

## 2024-02-25 RX ADMIN — Medication 1 LOZENGE: at 19:59

## 2024-02-25 RX ADMIN — Medication 1 LOZENGE: at 04:39

## 2024-02-25 RX ADMIN — DIVALPROEX SODIUM 500 MG: 500 TABLET, DELAYED RELEASE ORAL at 21:10

## 2024-02-25 RX ADMIN — CHLORPROMAZINE HYDROCHLORIDE 50 MG: 50 TABLET, FILM COATED ORAL at 21:10

## 2024-02-25 RX ADMIN — DIVALPROEX SODIUM 500 MG: 500 TABLET, DELAYED RELEASE ORAL at 08:06

## 2024-02-25 RX ADMIN — AMLODIPINE BESYLATE 5 MG: 5 TABLET ORAL at 08:06

## 2024-02-25 RX ADMIN — CHLORPROMAZINE HYDROCHLORIDE 50 MG: 50 TABLET, FILM COATED ORAL at 13:25

## 2024-02-25 ASSESSMENT — PAIN DESCRIPTION - DESCRIPTORS: DESCRIPTORS: SORE

## 2024-02-25 ASSESSMENT — PAIN SCALES - GENERAL
PAINLEVEL_OUTOF10: 2
PAINLEVEL_OUTOF10: 2

## 2024-02-25 ASSESSMENT — PAIN DESCRIPTION - LOCATION: LOCATION: THROAT

## 2024-02-25 NOTE — BH NOTE
1915- Patient observed resting quietly in bed with eyes closed. 1:1 staff continues to monitor for safety.    2115- Patient continues to rest quietly with eyes closed. 1:1 staff continues to monitor for safety.     2315- Patient continues to rest quietly with eyes closed; 1:1 staff monitors for safety.    0115- Patient continues to rest quietly appears to be sleeping. 1:1 staff continues to monitor for safety.     0315- Patient continues to rest quietly with 1:1 staff at bedside.    0400- Patient is currently awake at this time. No behaviors reported and none observed. Received a snack and is currently interacting with staff. 1:1 staff continues to monitor for safety.    0600- Patient is currently in the dayroom with 1:1 staff coloring. 1:1 staff continues to monitor for safety.

## 2024-02-25 NOTE — BH NOTE
Dr. SHAAN Osuna made aware of pt continuously high blood pressure last blood pressure reading 164/107 telephone orders to increase scheduled Norvasc to 10 mg friedman.

## 2024-02-25 NOTE — GROUP NOTE
Group Therapy Note    Date: 2/25/2024    Group Start Time: 1123  Group End Time: 1155  Group Topic: Education Group - Inpatient    SSR 2  NON ACUTE    Perri Dumas        Group Therapy Note    Facilitated group to focus on understanding the importance of healthy boundaries and developing healthy boundaries to help improve relationships        Attendees: 2/8      Notes:  Encouraged but did not attend    Discipline Responsible: Recreational Therapist      Signature:  DAVID Tran

## 2024-02-25 NOTE — GROUP NOTE
Group Therapy Note    Date: 2/25/2024    Group Start Time: 1535  Group End Time: 1625  Group Topic: Recreational    SSR 2  NON ACUTE    Perri Dumas        Group Therapy Note    Facilitated leisure skills group to reinforce positive coping and to manage mood through music, social interaction, group activities and art task       Attendees: 4/8       Patient's Goal:  Client will use words to express feelings, wants and needs    Notes:  Pt was in and out of group. Receptive to listening to music and a song he selected. Displayed some dancing. Interacted with staff    Status After Intervention:  Some Improvement    Participation Level: Active Listener and Interactive    Participation Quality: Appropriate      Speech:  Some Rambling      Thought Process/Content: Perseverating      Affective Functioning: Blunted      Mood: anxious      Level of consciousness:  Alert and Preoccupied      Response to Learning: Progressing to goal      Endings: None Reported    Modes of Intervention: Socialization and Activity      Discipline Responsible: Recreational Therapist      Signature:  Perir Dumas, KENNETHS

## 2024-02-25 NOTE — BH NOTE
DAY SHIFT    0800: pt up ad vivian around unit. Pt seen doing jumping jacks and asking for breakfast. Pt takes medication without difficulty. Pt energetic and talkative this am. Pt eating breakfast and interacting with staff and peers.     1000: pt seen in day room with 1:1 sitter talking    1130: pt in room cleaning his room and making the bed. Pt cooperative and making jokes with staff.     1330: Pt coloring in day room and given medication, pt encouraged to lay down and to get rest. Pt takes a shower instead. Pt denies depression and anxiety. Pt denies SI/HI. Pt asks this writer to take his blood pressure blood increased 164/107 MD notified.    1545: pt seen in music group dancing and singing during music group with bright affect. 1:1 sitter present    1700: pt seen in day room eating his dinner pt eating meals well 100% consumed.1:1 sitter present    1805:pt seen in hallway dancing and exercising in the hallway talking with staff. 1:1 sitter present

## 2024-02-26 LAB
ALBUMIN SERPL-MCNC: 3.5 G/DL (ref 3.5–5)
ALBUMIN/GLOB SERPL: 0.9 (ref 1.1–2.2)
ALP SERPL-CCNC: 66 U/L (ref 45–117)
ALT SERPL-CCNC: 16 U/L (ref 12–78)
AST SERPL W P-5'-P-CCNC: 20 U/L (ref 15–37)
BILIRUB DIRECT SERPL-MCNC: 0.2 MG/DL (ref 0–0.2)
BILIRUB SERPL-MCNC: 0.7 MG/DL (ref 0.2–1)
GLOBULIN SER CALC-MCNC: 4.1 G/DL (ref 2–4)
PROT SERPL-MCNC: 7.6 G/DL (ref 6.4–8.2)
VALPROATE SERPL-MCNC: 119 UG/ML (ref 50–100)

## 2024-02-26 PROCEDURE — 6370000000 HC RX 637 (ALT 250 FOR IP): Performed by: PSYCHIATRY & NEUROLOGY

## 2024-02-26 PROCEDURE — 36415 COLL VENOUS BLD VENIPUNCTURE: CPT

## 2024-02-26 PROCEDURE — 6370000000 HC RX 637 (ALT 250 FOR IP): Performed by: INTERNAL MEDICINE

## 2024-02-26 PROCEDURE — 80164 ASSAY DIPROPYLACETIC ACD TOT: CPT

## 2024-02-26 PROCEDURE — 1140000000 HC RM PRIVATE PSYCH

## 2024-02-26 PROCEDURE — 80076 HEPATIC FUNCTION PANEL: CPT

## 2024-02-26 RX ORDER — HALOPERIDOL 5 MG/1
5 TABLET ORAL 2 TIMES DAILY
Status: DISCONTINUED | OUTPATIENT
Start: 2024-02-26 | End: 2024-02-27

## 2024-02-26 RX ADMIN — HALOPERIDOL 5 MG: 5 TABLET ORAL at 21:38

## 2024-02-26 RX ADMIN — AMLODIPINE BESYLATE 10 MG: 5 TABLET ORAL at 08:17

## 2024-02-26 RX ADMIN — CHLORPROMAZINE HYDROCHLORIDE 50 MG: 50 TABLET, FILM COATED ORAL at 21:38

## 2024-02-26 RX ADMIN — DIVALPROEX SODIUM 500 MG: 500 TABLET, DELAYED RELEASE ORAL at 08:17

## 2024-02-26 RX ADMIN — DIVALPROEX SODIUM 500 MG: 500 TABLET, DELAYED RELEASE ORAL at 13:52

## 2024-02-26 RX ADMIN — Medication 1 LOZENGE: at 05:04

## 2024-02-26 RX ADMIN — DIVALPROEX SODIUM 500 MG: 500 TABLET, DELAYED RELEASE ORAL at 21:38

## 2024-02-26 RX ADMIN — CHLORPROMAZINE HYDROCHLORIDE 50 MG: 50 TABLET, FILM COATED ORAL at 13:52

## 2024-02-26 RX ADMIN — CHLORPROMAZINE HYDROCHLORIDE 50 MG: 50 TABLET, FILM COATED ORAL at 08:17

## 2024-02-26 RX ADMIN — HALOPERIDOL 5 MG: 5 TABLET ORAL at 13:52

## 2024-02-26 ASSESSMENT — PAIN SCALES - GENERAL: PAINLEVEL_OUTOF10: 0

## 2024-02-26 NOTE — GROUP NOTE
Group Therapy Note    Date: 2/26/2024    Group Start Time: 1545  Group End Time: 1630  Group Topic: Recreational    SSR 2  NON ACUTE    Perri Dumas        Group Therapy Note    Facilitated leisure skills group to reinforce positive coping and to manage mood through music, social interaction, group activities and art task       Attendees: 5/11       Patient's Goal:  Client will use words to express feelings, wants and needs    Notes:  Pt was receptive to listening to music while working on leisure task. Interacted with peer and staff. Was in and out of group     Status After Intervention:  Some Improvement    Participation Level: Active Listener and Interactive    Participation Quality: Appropriate and Attentive      Speech:  normal but a few times loud      Thought Process/Content: Tangential at times      Affective Functioning: Blunted      Mood: anxious      Level of consciousness:  Attentive      Response to Learning: Progressing to goal      Endings: None Reported    Modes of Intervention: Socialization and Activity      Discipline Responsible: Recreational Therapist      Signature:  Perri Dumas, KENNETHS

## 2024-02-26 NOTE — BH NOTE
Behavioral Health Treatment Team Note     Patient goal(s) for today: \"to go home\"  Treatment team focus/goals: continue medication management, group therapy, maintain ADLs and provide a safe discharge    Progress note: Pt presented with a liable affect and mood in treatment team. Pt denied any SI/HI/Avh and mental health problems. When pt was walking with other staff pt was laughing and cooperative. During treatment team pts thoughts were racing and he targeted the doctor. Pt believed the Dr. Aguilar was the doctor that placed him in the hospital 3 years ago. His speech was rapid and loud. He would get up and walk towards the doctor but then was redirected by the nursing staff. Pt shared that he has to go to work and that he is missing out on making money. He also shared that his car was at home in the driveway. Writer reached out to the family and they shared that they have talked with him but that he \"still wasn't making sense\". Pt told them that he works for the  and that the  will be sending him home in an uber. They verified that the pts car is not at home and that they can not  that pt today. They will try to  the pt tomorrow. Dr. Aguilar will be ordering Haladol and possibly give the pt an long acting injection before he leaves due to the pt not being med-compliant/lack of insight. An inpatient level of care is needed to further stabilize the pt and provide a safe discharge    LOS:  7  Expected LOS: TDO until 2-29 with [facility]    Insurance info/prescription coverage:  Medicare  Date of last family contact:  2-26-24  Family requesting physician contact today:  No  Discharge plan:  to stabilize the pt  Guns in the home:  No   Outpatient provider(s):  will be coordinated prior to discharge    Participating treatment team members: Lewis Magallanes, * (assigned SW), Odette Huber LMSW

## 2024-02-26 NOTE — BH NOTE
DAY SHIFT    Pt up ad vivian on unit today. Pt talkative and presents with bright affect. Pt is smiling and joking with staff and peers. Pt seen dancing around unit and colors multiple coloring sheets today. Pt taking medication without difficulty.     0800: pt seen in day room awaiting breakfast tray pt watchig tv and talking ot staff 1:1 sitter present    1030- pt in treatment team discussing dischrge plans and plan of care    1200: pt seen eating lunch in day room 1:1 sitter present     1400: pt talking to 1:1 staff making jokes and coloring in day room 1:1 sitter present    1530: Pt attending music group 1:1 sitter present    1730: pt took a shower and is now resting in the quite room 1:1 sitter present    1815: pt resting in quite room 1:1 sitter present

## 2024-02-26 NOTE — GROUP NOTE
Group Therapy Note    Date: 2/26/2024    Group Start Time: 1320  Group End Time: 1350  Group Topic: Process Group - Inpatient    SSR 2 [facility]    Odette Huber        Group Therapy Note  Writer had a difficult time engaging pts in group. Writer attempted to talk about their strengths and weaknesses while using animals. Pts were not receptive of the activity. Writer then spoke about what their goals will be when they discharge   Attendees: 3-11       Patient's Goal:  \"go home\"    Notes:  Pt was interactive laughing and joking with the writer and other peers. He shared that he want's to \"go home and eat good food\". Pt appeared to have a difficult time sitting in one around and would get up and walk around. Pt was not disruptive and was able to be redirected easily     Status After Intervention:  Improved    Participation Level: Interactive    Participation Quality: Attentive      Speech:  slurred      Thought Process/Content: Logical      Affective Functioning: Congruent      Mood:  \"good\"      Level of consciousness:  Alert      Response to Learning: Able to retain information and Able to change behavior      Endings: None Reported    Modes of Intervention: Support, Exploration, and Limit-setting      Discipline Responsible: /Counselor      Signature:  Odette Huber

## 2024-02-26 NOTE — BH NOTE
1930- Patient observed with 1:1 staff this evening; no concerns reported and none observed. Will continue to monitor for safety.    2130- Patient is medication compliant; 1:1 staff continues to monitor for safety. No report of SI, ,HI, A/V hallucinations. No report of anxiety or depression. No report of pain. Will continue to monitor for safety.    2330- Patient is currently resting quietly at this time with eyes closed; 1:1 staff continues to monitor for safety.    0130- Patient observed with 1:1 staff and is \"up and down\"; no behavior concerns reported or observed. Will continue to monitor.    0330- Patient resting quietly at this time with eyes closed; 1:1 staff continues to monitor for safety.    0530-

## 2024-02-26 NOTE — GROUP NOTE
Group Therapy Note    Date: 2/26/2024    Group Start Time: 1105  Group End Time: 1143  Group Topic: Education Group - Inpatient    SSR 2  NON ACUTE    Perri Dumas        Group Therapy Note    Facilitated group to introduce the definition of self-esteem and discuss information relating to creating steps to greater self-appreciation and recognizing symptoms of self-defeat       Attendees: 4/10       Patient's Goal:  Client will use words to express feelings, wants and needs    Notes: Pt attended but did not engage in discussion. Pt wanted to continue coloring art sheets    Status After Intervention:  Improved    Participation Level: Minimal    Participation Quality: Appropriate      Speech:  normal      Thought Process/Content: Tangential at times      Affective Functioning: Blunted      Mood:  Calm      Level of consciousness:  Attentive      Response to Learning: Progressing to goal      Endings: None Reported    Modes of Intervention: Education and Support      Discipline Responsible: Recreational Therapist      Signature:  DAVID Tran

## 2024-02-27 PROCEDURE — 1140000000 HC RM PRIVATE PSYCH

## 2024-02-27 PROCEDURE — 6370000000 HC RX 637 (ALT 250 FOR IP): Performed by: PSYCHIATRY & NEUROLOGY

## 2024-02-27 PROCEDURE — 6360000002 HC RX W HCPCS: Performed by: PSYCHIATRY & NEUROLOGY

## 2024-02-27 PROCEDURE — 6370000000 HC RX 637 (ALT 250 FOR IP): Performed by: INTERNAL MEDICINE

## 2024-02-27 RX ORDER — AMLODIPINE BESYLATE 10 MG/1
10 TABLET ORAL DAILY
Qty: 30 TABLET | Refills: 0 | Status: SHIPPED | OUTPATIENT
Start: 2024-02-27

## 2024-02-27 RX ORDER — BENZTROPINE MESYLATE 1 MG/1
1 TABLET ORAL DAILY
Qty: 60 TABLET | Refills: 0 | Status: SHIPPED | OUTPATIENT
Start: 2024-02-27

## 2024-02-27 RX ORDER — CHLORPROMAZINE HYDROCHLORIDE 50 MG/1
50 TABLET, FILM COATED ORAL 3 TIMES DAILY
Qty: 90 TABLET | Refills: 0 | Status: SHIPPED | OUTPATIENT
Start: 2024-02-27

## 2024-02-27 RX ORDER — DIVALPROEX SODIUM 500 MG/1
500 TABLET, DELAYED RELEASE ORAL 2 TIMES DAILY
Qty: 60 TABLET | Refills: 0 | Status: SHIPPED | OUTPATIENT
Start: 2024-02-27

## 2024-02-27 RX ORDER — HALOPERIDOL DECANOATE 50 MG/ML
50 INJECTION INTRAMUSCULAR
Status: DISCONTINUED | OUTPATIENT
Start: 2024-02-27 | End: 2024-02-28 | Stop reason: HOSPADM

## 2024-02-27 RX ORDER — HALOPERIDOL DECANOATE 50 MG/ML
50 INJECTION INTRAMUSCULAR ONCE
Qty: 1 ML | Refills: 0 | Status: SHIPPED | OUTPATIENT
Start: 2024-03-27 | End: 2024-03-27

## 2024-02-27 RX ADMIN — DIVALPROEX SODIUM 500 MG: 500 TABLET, DELAYED RELEASE ORAL at 08:21

## 2024-02-27 RX ADMIN — DIVALPROEX SODIUM 500 MG: 500 TABLET, DELAYED RELEASE ORAL at 21:10

## 2024-02-27 RX ADMIN — CHLORPROMAZINE HYDROCHLORIDE 50 MG: 50 TABLET, FILM COATED ORAL at 13:32

## 2024-02-27 RX ADMIN — HALOPERIDOL DECANOATE 50 MG: 50 INJECTION INTRAMUSCULAR at 12:20

## 2024-02-27 RX ADMIN — HALOPERIDOL 5 MG: 5 TABLET ORAL at 08:21

## 2024-02-27 RX ADMIN — CHLORPROMAZINE HYDROCHLORIDE 50 MG: 50 TABLET, FILM COATED ORAL at 21:10

## 2024-02-27 RX ADMIN — CHLORPROMAZINE HYDROCHLORIDE 50 MG: 50 TABLET, FILM COATED ORAL at 08:21

## 2024-02-27 RX ADMIN — LORAZEPAM 2 MG: 1 TABLET ORAL at 15:05

## 2024-02-27 RX ADMIN — AMLODIPINE BESYLATE 10 MG: 5 TABLET ORAL at 08:21

## 2024-02-27 RX ADMIN — DIVALPROEX SODIUM 500 MG: 500 TABLET, DELAYED RELEASE ORAL at 13:31

## 2024-02-27 NOTE — BH NOTE
Patient remains on 1:1 observation.  Patient has been sleeping in the quiet room due to his room being too cold for him.  He has mostly been asleep.  He was awakened once for medication administration and once to reassess his blood pressure.  It was noticed that patient's AM BP was 174/128 and it was not recorded at shift change.   It  was retaken this evening and was 126/87 with a pulse of 87.  Patient is playful in his interactions.  He is pleasant.  He was medication compliant.  He has not voiced SI, HI depression or anxiety.  Continue to assess.    0042 1:1 Note---Patient is laying down quietly with his eyes closed.    0200 1:1 Note---Patient resting quietly in bed with his eyes closed.    0400 1:1 Note---Patient resting quietly in bed with his eyes closed.    0500 1:1 Note--Patient has been awake and walking on the unit.  He has been in his room some as well.

## 2024-02-27 NOTE — BH NOTE
Behavioral Health Transition Record to Provider    Patient Name: Lewis Magallanes  YOB: 1965  Medical Record Number: [id number]  Date of Admission: 2/19/2024  Date of Discharge: 2-27-24    Attending Provider: Darrius Aguilar MD  Discharging Provider: Dr. Aguilar  To contact this individual call [phone] and ask the  to page.  If unavailable, ask to be transferred to Behavioral Health Provider on call.  A Behavioral Health Provider will be available on call 24/7 and during holidays.    Primary Care Provider: Unknown, Provider, DO    No Known Allergies    Reason for Admission: Pt was brought into the [facility] in [facility] by police after the family called due to his bizarre, manic behaviors. It was reported by the NE that the pt was in the street yelling at no one. Pt shared that nothing was wrong with him and that he has never been hospitalized before. Family reported that the pt has been and that he was \"acting out\". When asked if he was HI/SI he stated \"there has been only 2 people, 2 people dead this morning\"     Admission Diagnosis: Schizophrenia (HCC) [F20.9]    * No surgery found *    Results for orders placed or performed during the hospital encounter of 02/19/24   Valproic Acid Level, Total   Result Value Ref Range    Valproic Acid 119 (H) 50 - 100 ug/mL   Hepatic Function Panel   Result Value Ref Range    Total Protein 7.6 6.4 - 8.2 g/dL    Albumin 3.5 3.5 - 5.0 g/dL    Globulin 4.1 (H) 2.0 - 4.0 g/dL    Albumin/Globulin Ratio 0.9 (L) 1.1 - 2.2      Total Bilirubin 0.7 0.2 - 1.0 mg/dL    Bilirubin, Direct 0.2 0.0 - 0.2 mg/dL    Alk Phosphatase 66 45 - 117 U/L    AST 20 15 - 37 U/L    ALT 16 12 - 78 U/L       Immunizations administered during this encounter:   There is no immunization history on file for this patient.    Screening for Metabolic Disorders for Patients on Antipsychotic Medications  (Data obtained from the EMR)    Estimated Body Mass Index  Estimated body mass

## 2024-02-27 NOTE — BH NOTE
0745- Pt sitting dayroom, coloring. No bx issues noted. Happy to be going home today. 1:1 FOR SAFETY..    0950- Pt in dayroom, making phone calls. 1:1 in place for safety.    1145- Pt getting anxious that no one has come to pick him up at this time. Odette spoke with family yesterday, she told them \"after lunch\". She will call if not here by 1pm.    1509- Pt becoming agitated because his family are coming for him, but because they all work it will be around 8 pm this evening. Ativan 2 mg PO given for agitation.     1645- Pt was sleeping, woke for dinner. Ate and went back to bed. 1:1 at bedside.

## 2024-02-27 NOTE — GROUP NOTE
Group Therapy Note    Date: 2/27/2024    Group Start Time: 1315  Group End Time: 1400  Group Topic: Process Group - Inpatient    SSR 2 [facility] ACUTE    Lorraine Villegas MSW        Group Therapy Note: Facilitator engaged the group in sharing something they wished people knew about them. Discussed CBT, coping skills, boundaries and \"I feel statements\".    Attendees: 3       Patient's Goal:  Pt was encouraged to attend but he was getting ready to discharge.       Signature:  TYRA PIEDRA

## 2024-02-27 NOTE — BH NOTE
DISCHARGE SUMMARY    NAME:Lewis Magallanes  : 1965  MRN: [id number]    The patient Lewis Magallanes exhibits the ability to control behavior in a less restrictive environment.  Patient's level of functioning is improving.  No assaultive/destructive behavior has been observed for the past 24 hours.  No suicidal/homicidal threat or behavior has been observed for the past 24 hours.  There is no evidence of serious medication side effects.  Patient has not been in physical or protective restraints for at least the past 24 hours.    If weapons involved, how are they secured? N/a    Is patient aware of and in agreement with discharge plan? yes    Arrangements for medication:  Prescriptions will be sent to the pharmacy on file    Copy of discharge instructions to provider?:  yes    Arrangements for transportation home:  Pt will be picked up by family    Keep all follow up appointments as scheduled, continue to take prescribed medications per physician instructions.  Mental health crisis number:  911 or your local mental health crisis line number at [phone]       Mental Health Emergency WARM LINE      [phone])      M-F: 9am to 9pm      Sat & Sun: 5pm - 9pm  National suicide prevention lines:                             2-499-WJDRCQW (4-536-111-3925)       4-806-284-TALK ([phone])    Crisis Text Line:  Text HOME to [phone]

## 2024-02-28 VITALS
WEIGHT: 160 LBS | TEMPERATURE: 98 F | OXYGEN SATURATION: 100 % | SYSTOLIC BLOOD PRESSURE: 143 MMHG | HEIGHT: 67 IN | RESPIRATION RATE: 18 BRPM | HEART RATE: 103 BPM | BODY MASS INDEX: 25.11 KG/M2 | DIASTOLIC BLOOD PRESSURE: 93 MMHG

## 2024-02-28 PROCEDURE — 6370000000 HC RX 637 (ALT 250 FOR IP): Performed by: INTERNAL MEDICINE

## 2024-02-28 PROCEDURE — 6370000000 HC RX 637 (ALT 250 FOR IP): Performed by: PSYCHIATRY & NEUROLOGY

## 2024-02-28 RX ADMIN — CHLORPROMAZINE HYDROCHLORIDE 50 MG: 50 TABLET, FILM COATED ORAL at 14:51

## 2024-02-28 RX ADMIN — CHLORPROMAZINE HYDROCHLORIDE 50 MG: 50 TABLET, FILM COATED ORAL at 08:56

## 2024-02-28 RX ADMIN — DIVALPROEX SODIUM 500 MG: 500 TABLET, DELAYED RELEASE ORAL at 08:56

## 2024-02-28 RX ADMIN — DIVALPROEX SODIUM 500 MG: 500 TABLET, DELAYED RELEASE ORAL at 14:51

## 2024-02-28 RX ADMIN — AMLODIPINE BESYLATE 10 MG: 5 TABLET ORAL at 08:56

## 2024-02-28 RX ADMIN — Medication 1 LOZENGE: at 06:03

## 2024-02-28 NOTE — GROUP NOTE
Group Therapy Note    Date: 2/28/2024    Group Start Time: 1115  Group End Time: 1200  Group Topic: Process Group - Inpatient    SSR 2 [facility] ACUTE    Odette Huber        Group Therapy Note  Process group was focused on the pts social skills and exploring positive aspects of the life. Writer asked the pts to pick a number 1-24. Writer would read the questions and pts would each share their thoughts with one another. Pts interacted well with one another providing positive feedback. Pts were observed laughing and sharing different ideas with one another.    Attendees: 3-8         Notes:  Pt was encouraged to attend and chose not to         Signature:  Odette Huber

## 2024-02-28 NOTE — PROGRESS NOTES
used, Zenaida #[id number] for mental health assessment, patient is alert and orientedx2, he denies si/avh/hi, pt is very intrusive labile irritable and difficult to redirect.  Pt has been going in and out of patient's room. Lewis is medication and meal compliant, medicated prn with ativan po 2mg.  Will continue to monitor patient.       1745hrs-patient has flooded the bathroom water on bathroom and bedroom floor.  Pt continues to be intrusive, walking throughout the unit invading patient's personal space.  Pt medicated with 20mg of PO geodon.  
Discussed case interviewed the patient  Increased level of activity at times  And can be energetic and talkative  Affect is labile  Attends groups and interacts with peers and staff  Eating and sleeping okay  No overt aggression    Alert and oriented cooperative  Speech is goal directed soft tone  Somewhat hypomanic outlook  Response to one-to-one sitter  No overt aggression    Continue inpatient treatment  Follow-up with psychiatry again tomorrow  
PSYCHIATRIC PROGRESS NOTE         Patient Name  Lewis Magallanes   Date of Birth 1965   [facility] [id number]   Medical Record Number  [id number]      Age  58 y.o.   PCP Unknown, Provider, DO   Admit date:  2/19/2024    Room Number  241/01   [facility]   Date of Service  2/27/2024            HISTORY OF PRESENT ILLNESS/INTERVAL HISTORY:              MENTAL STATUS EXAM & VITALS                    VITALS:     Patient Vitals for the past 24 hrs:   Temp Pulse Resp BP   02/27/24 0821 -- -- -- (!) 142/98   02/27/24 0724 98.4 °F (36.9 °C) 99 18 (!) 142/98   02/26/24 2300 97.2 °F (36.2 °C) 87 18 126/87     Wt Readings from Last 3 Encounters:   02/19/24 72.6 kg (160 lb)     Temp Readings from Last 3 Encounters:   02/27/24 98.4 °F (36.9 °C) (Oral)     BP Readings from Last 3 Encounters:   02/27/24 (!) 142/98     Pulse Readings from Last 3 Encounters:   02/27/24 99            DATA     LABORATORY DATA:(reviewed/updated 2/27/2024)  No results found for this or any previous visit (from the past 24 hour(s)).   Lab Results   Component Value Date/Time    VALAC 119 02/26/2024 12:45 PM     No results found for: \"LITHM\"   RADIOLOGY REPORTS:(reviewed/updated 2/27/2024)  No results found.       MEDICATIONS     ALL MEDICATIONS:   Current Facility-Administered Medications   Medication Dose Route Frequency    haloperidol decanoate (HALDOL DECANOATE) injection 50 mg  50 mg IntraMUSCular Q30 Days    amLODIPine (NORVASC) tablet 10 mg  10 mg Oral Daily    chlorproMAZINE (THORAZINE) tablet 50 mg  50 mg Oral TID    Benzocaine-Menthol (CEPACOL) 1 lozenge  1 lozenge Oral Q2H PRN    ziprasidone (GEODON) injection 20 mg  20 mg IntraMUSCular BID PRN    acetaminophen (TYLENOL) tablet 650 mg  650 mg Oral Q4H PRN    hydrOXYzine HCl (ATARAX) tablet 50 mg  50 mg Oral TID PRN    magnesium hydroxide (MILK OF MAGNESIA) 400 MG/5ML suspension 30 mL  30 mL Oral Daily PRN    aluminum & magnesium hydroxide-simethicone (MAALOX) 200-200-20 MG/5ML suspension 30 
PSYCHIATRIC PROGRESS NOTE         Patient Name  Lewis Magallanes   Date of Birth 1965   [facility] [id number]   Medical Record Number  [id number]      Age  58 y.o.   PCP Unknown, Provider, DO   Admit date:  2/19/2024    Room Number  241/01   [facility]   Date of Service  2/23/2024            HISTORY OF PRESENT ILLNESS/INTERVAL HISTORY:              MENTAL STATUS EXAM & VITALS                    VITALS:     Patient Vitals for the past 24 hrs:   Temp Pulse Resp BP   02/23/24 1904 98.3 °F (36.8 °C) (!) 108 18 (!) 139/100   02/23/24 0714 97.7 °F (36.5 °C) (!) 121 18 (!) 128/98   02/23/24 0656 -- (!) 128 -- --     Wt Readings from Last 3 Encounters:   02/19/24 72.6 kg (160 lb)     Temp Readings from Last 3 Encounters:   02/23/24 98.3 °F (36.8 °C) (Oral)     BP Readings from Last 3 Encounters:   02/23/24 (!) 139/100     Pulse Readings from Last 3 Encounters:   02/23/24 (!) 108            DATA     LABORATORY DATA:(reviewed/updated 2/23/2024)  No results found for this or any previous visit (from the past 24 hour(s)).   No results found for: \"VALAC\", \"VALP\", \"CARB2\"  No results found for: \"LITHM\"   RADIOLOGY REPORTS:(reviewed/updated 2/23/2024)  No results found.       MEDICATIONS     ALL MEDICATIONS:   Current Facility-Administered Medications   Medication Dose Route Frequency    chlorproMAZINE (THORAZINE) tablet 50 mg  50 mg Oral TID    Benzocaine-Menthol (CEPACOL) 1 lozenge  1 lozenge Oral Q2H PRN    ziprasidone (GEODON) injection 20 mg  20 mg IntraMUSCular BID PRN    acetaminophen (TYLENOL) tablet 650 mg  650 mg Oral Q4H PRN    hydrOXYzine HCl (ATARAX) tablet 50 mg  50 mg Oral TID PRN    magnesium hydroxide (MILK OF MAGNESIA) 400 MG/5ML suspension 30 mL  30 mL Oral Daily PRN    aluminum & magnesium hydroxide-simethicone (MAALOX) 200-200-20 MG/5ML suspension 30 mL  30 mL Oral Q6H PRN    amLODIPine (NORVASC) tablet 5 mg  5 mg Oral Daily    ziprasidone (GEODON) capsule 20 mg  20 mg Oral Q12H PRN    LORazepam (ATIVAN) 
PSYCHIATRIC PROGRESS NOTE         Patient Name  Lewis Magallanes   Date of Birth 1965   [facility] [id number]   Medical Record Number  [id number]      Age  58 y.o.   PCP Unknown, Provider, DO   Admit date:  2/19/2024    Room Number  241/01   [facility]   Date of Service  2/21/2024            HISTORY OF PRESENT ILLNESS/INTERVAL HISTORY:              MENTAL STATUS EXAM & VITALS                    VITALS:     Patient Vitals for the past 24 hrs:   Temp Pulse Resp BP SpO2   02/21/24 1919 98.5 °F (36.9 °C) (!) 123 18 137/84 100 %   02/21/24 0707 98.6 °F (37 °C) (!) 112 20 (!) 149/103 --     Wt Readings from Last 3 Encounters:   02/19/24 72.6 kg (160 lb)     Temp Readings from Last 3 Encounters:   02/21/24 98.5 °F (36.9 °C) (Oral)     BP Readings from Last 3 Encounters:   02/21/24 137/84     Pulse Readings from Last 3 Encounters:   02/21/24 (!) 123            DATA     LABORATORY DATA:(reviewed/updated 2/21/2024)  No results found for this or any previous visit (from the past 24 hour(s)).   No results found for: \"VALAC\", \"VALP\", \"CARB2\"  No results found for: \"LITHM\"   RADIOLOGY REPORTS:(reviewed/updated 2/21/2024)  No results found.       MEDICATIONS     ALL MEDICATIONS:   Current Facility-Administered Medications   Medication Dose Route Frequency    Benzocaine-Menthol (CEPACOL) 1 lozenge  1 lozenge Oral Q2H PRN    ziprasidone (GEODON) injection 20 mg  20 mg IntraMUSCular BID PRN    acetaminophen (TYLENOL) tablet 650 mg  650 mg Oral Q4H PRN    hydrOXYzine HCl (ATARAX) tablet 50 mg  50 mg Oral TID PRN    magnesium hydroxide (MILK OF MAGNESIA) 400 MG/5ML suspension 30 mL  30 mL Oral Daily PRN    aluminum & magnesium hydroxide-simethicone (MAALOX) 200-200-20 MG/5ML suspension 30 mL  30 mL Oral Q6H PRN    amLODIPine (NORVASC) tablet 5 mg  5 mg Oral Daily    ziprasidone (GEODON) capsule 20 mg  20 mg Oral Q12H PRN    LORazepam (ATIVAN) tablet 2 mg  2 mg Oral Q6H PRN    LORazepam (ATIVAN) injection 2 mg  2 mg IntraMUSCular 
PSYCHIATRIC PROGRESS NOTE         Patient Name  Lewis Magallanes   Date of Birth 1965   [facility] [id number]   Medical Record Number  [id number]      Age  58 y.o.   PCP Unknown, Provider, DO   Admit date:  2/19/2024    Room Number  241/01   [facility]   Date of Service  2/26/2024            HISTORY OF PRESENT ILLNESS/INTERVAL HISTORY:              MENTAL STATUS EXAM & VITALS                    VITALS:     Patient Vitals for the past 24 hrs:   Temp Pulse Resp BP   02/26/24 0730 97.2 °F (36.2 °C) (!) 102 18 (!) 174/128     Wt Readings from Last 3 Encounters:   02/19/24 72.6 kg (160 lb)     Temp Readings from Last 3 Encounters:   02/26/24 97.2 °F (36.2 °C) (Oral)     BP Readings from Last 3 Encounters:   02/26/24 (!) 174/128     Pulse Readings from Last 3 Encounters:   02/26/24 (!) 102            DATA     LABORATORY DATA:(reviewed/updated 2/26/2024)  Recent Results (from the past 24 hour(s))   Valproic Acid Level, Total    Collection Time: 02/26/24 12:45 PM   Result Value Ref Range    Valproic Acid 119 (H) 50 - 100 ug/mL   Hepatic Function Panel    Collection Time: 02/26/24 12:45 PM   Result Value Ref Range    Total Protein 7.6 6.4 - 8.2 g/dL    Albumin 3.5 3.5 - 5.0 g/dL    Globulin 4.1 (H) 2.0 - 4.0 g/dL    Albumin/Globulin Ratio 0.9 (L) 1.1 - 2.2      Total Bilirubin 0.7 0.2 - 1.0 mg/dL    Bilirubin, Direct 0.2 0.0 - 0.2 mg/dL    Alk Phosphatase 66 45 - 117 U/L    AST 20 15 - 37 U/L    ALT 16 12 - 78 U/L      Lab Results   Component Value Date/Time    VALAC 119 02/26/2024 12:45 PM     No results found for: \"LITHM\"   RADIOLOGY REPORTS:(reviewed/updated 2/26/2024)  No results found.       MEDICATIONS     ALL MEDICATIONS:   Current Facility-Administered Medications   Medication Dose Route Frequency    haloperidol (HALDOL) tablet 5 mg  5 mg Oral BID    amLODIPine (NORVASC) tablet 10 mg  10 mg Oral Daily    chlorproMAZINE (THORAZINE) tablet 50 mg  50 mg Oral TID    Benzocaine-Menthol (CEPACOL) 1 lozenge  1 lozenge 
services continue to be, required for treatment that could reasonably be expected to improve the patient's condition and that the patient continues to need, on a daily basis, active treatment furnished directly by or requiring the supervision of inpatient psychiatric facility personnel. In addition, the hospital records show that services furnished were intensive treatment services.    Signed By:   Darrius Aguilar MD  2/22/2024

## 2024-02-28 NOTE — BH NOTE
Patient remains on close observation.   Patient has been mostly asleep, occasionally alert, oriented, cooperative and pleasant.  Patient has not voiced depression, anxiety, SI or HI.  Patient was medication compliant.  Continue to assess.    0030 1:1 Note--Patient laying down with his eyes closed.  Snoring.  Breathing pattern is steady.    0200 1:1 Note--Patient laying down quietly in bed.  Breathing is even and unlabored.    0400 1:1 Note--patient has mostly been laying in bed this shift.    0600 1:1 Note--Patient has been moving around some these last two hours.  He is pleasant and cooperative.  No behavioral issues.

## 2024-02-28 NOTE — BH NOTE
Patient remains on 1:1 Observation.  Patient was laying in bed with his eyes closed.  Patient's daughter called; she identified herself as \"Gayathri Harman.\"  The patient confirmed this was his daughter.  Patient gave staff permission to speak with this individual.  The caller informed 2 [facility] staff that patient's ride provider could not come tonight.  This was discussed with the patient and with Dr. Lauren MD. Patient is to be picked up tomorrow by noon per caller.  The Unit Director also knows.  Continue to assess.

## 2024-02-28 NOTE — GROUP NOTE
Group Therapy Note    Date: 2/27/2024    Group Start Time: 1945  Group End Time: 2030  Group Topic: Recreational    SSR 2 BH NON ACUTE    Carmela Jonas        Group Therapy Note    Attendees: 2/7    Recreational Therapist facilitated structured leisure skills group to introduce healthy leisure skills as positive way to cope and manage mood.           Notes:  Did not attend group despite encouragement      Discipline Responsible: Recreational Therapist      Signature:  DAVID Hooker

## 2024-02-28 NOTE — BH NOTE
Pt discharged this day.  Prior to leaving patient did confirm having all valuables and belongings.  Pt did receive written discharge instructions.  Pt did confirm having understood information.  Pt left the unit 1505 to return home via taxi.